# Patient Record
Sex: FEMALE | Race: WHITE
[De-identification: names, ages, dates, MRNs, and addresses within clinical notes are randomized per-mention and may not be internally consistent; named-entity substitution may affect disease eponyms.]

---

## 2017-02-06 ENCOUNTER — RESULT REVIEW (OUTPATIENT)
Age: 67
End: 2017-02-06

## 2017-02-07 ENCOUNTER — APPOINTMENT (OUTPATIENT)
Dept: OBGYN | Facility: CLINIC | Age: 67
End: 2017-02-07

## 2018-11-28 ENCOUNTER — APPOINTMENT (OUTPATIENT)
Dept: PAIN MANAGEMENT | Facility: CLINIC | Age: 68
End: 2018-11-28
Payer: COMMERCIAL

## 2018-11-28 VITALS
WEIGHT: 143 LBS | HEIGHT: 64.5 IN | DIASTOLIC BLOOD PRESSURE: 90 MMHG | BODY MASS INDEX: 24.12 KG/M2 | HEART RATE: 102 BPM | SYSTOLIC BLOOD PRESSURE: 136 MMHG

## 2018-11-28 PROCEDURE — 99213 OFFICE O/P EST LOW 20 MIN: CPT

## 2018-11-28 RX ORDER — ESCITALOPRAM OXALATE 10 MG/1
10 TABLET, FILM COATED ORAL DAILY
Refills: 0 | Status: ACTIVE | COMMUNITY

## 2019-01-14 ENCOUNTER — MEDICATION RENEWAL (OUTPATIENT)
Age: 69
End: 2019-01-14

## 2019-03-28 ENCOUNTER — APPOINTMENT (OUTPATIENT)
Dept: PAIN MANAGEMENT | Facility: CLINIC | Age: 69
End: 2019-03-28
Payer: COMMERCIAL

## 2019-03-28 VITALS
HEIGHT: 64.5 IN | HEART RATE: 98 BPM | BODY MASS INDEX: 24.45 KG/M2 | SYSTOLIC BLOOD PRESSURE: 157 MMHG | DIASTOLIC BLOOD PRESSURE: 87 MMHG | WEIGHT: 145 LBS

## 2019-03-28 PROCEDURE — 99213 OFFICE O/P EST LOW 20 MIN: CPT

## 2019-03-28 RX ORDER — MECLIZINE HYDROCHLORIDE 25 MG/1
25 TABLET ORAL
Qty: 30 | Refills: 0 | Status: ACTIVE | COMMUNITY
Start: 2019-03-08

## 2019-03-28 RX ORDER — GALCANEZUMAB 120 MG/ML
120 INJECTION, SOLUTION SUBCUTANEOUS
Qty: 3 | Refills: 2 | Status: DISCONTINUED | COMMUNITY
Start: 2018-11-28 | End: 2019-03-28

## 2019-03-28 RX ORDER — SCOPALAMINE 1 MG/3D
1 PATCH, EXTENDED RELEASE TRANSDERMAL
Qty: 1 | Refills: 0 | Status: ACTIVE | COMMUNITY
Start: 2019-03-08

## 2019-03-28 RX ORDER — GABAPENTIN 100 MG/1
100 CAPSULE ORAL
Qty: 270 | Refills: 1 | Status: ACTIVE | COMMUNITY
Start: 2019-02-14 | End: 1900-01-01

## 2019-03-29 ENCOUNTER — MEDICATION RENEWAL (OUTPATIENT)
Age: 69
End: 2019-03-29

## 2019-03-29 RX ORDER — ACETAMINOPHEN AND CODEINE 300; 30 MG/1; MG/1
300-30 TABLET ORAL
Qty: 30 | Refills: 1 | Status: ACTIVE | COMMUNITY
Start: 2019-03-28 | End: 1900-01-01

## 2019-04-01 NOTE — HISTORY OF PRESENT ILLNESS
[FreeTextEntry1] : The patient returned today for a follow up ov.  Maxalt provides relief but she is limiting to prevent rebound.  She is getting approximately 18-20 headaches per month and lasting approximately a few hours to day/few days.  The patient has tried BOTOX once in the past and states it was ineffective.  Her headaches are located across her entire head.  She also complains of N/V, photophobia, and phonophobia.   \par \par triggers:tired, nervous, not sleeping enough, weather changes, and alcohol.\par Past treatments:  botox, nortriptylline, advil, aleve, aspirin, axert, codeine, topamax, fioricet, frova, maxalt, magnesium, ultram, lamictal, inderal, excedrin, motrin, and tylenol #3.  She has not had IV infusion or acupuncture. \par \par Current treatments: Maxalt, excedrin, and tylenol #3.  She takes the maxalt approximately 5-6 times per month and tylenol # 3-5 times per month and excedrine 8 times per month. She has been limiting her rescue medications to prevent rebound headaches.  She also notes that neurontin 100 mgs tid has helped some with her headaches and requests a refill for now.

## 2019-04-01 NOTE — PHYSICAL EXAM
[General Appearance - Alert] : alert [Affect] : the affect was normal [Person] : oriented to person [Place] : oriented to place [Time] : oriented to time [Short Term Intact] : short term memory intact [Registration Intact] : recent registration memory intact [Cranial Nerves Optic (II)] : visual acuity intact bilaterally,  visual fields full to confrontation, pupils equal round and reactive to light [Cranial Nerves Oculomotor (III)] : extraocular motion intact [Cranial Nerves Trigeminal (V)] : facial sensation intact symmetrically [Cranial Nerves Facial (VII)] : face symmetrical [Cranial Nerves Vestibulocochlear (VIII)] : hearing was intact bilaterally [Cranial Nerves Hypoglossal (XII)] : there was no tongue deviation with protrusion [Motor Tone] : muscle tone was normal in all four extremities [Motor Strength] : muscle strength was normal in all four extremities [Sensation Tactile Decrease] : light touch was intact [Sclera] : the sclera and conjunctiva were normal [Outer Ear] : the ears and nose were normal in appearance [Neck Appearance] : the appearance of the neck was normal [Abnormal Walk] : normal gait [Skin Turgor] : normal skin turgor [] : no rash

## 2019-04-01 NOTE — ASSESSMENT
[FreeTextEntry1] : 68 year old female patient with chronic migraines and analgesic rebound.\par \par Aimovig 140 mgs q month ordered.  Reviewed with the patient potential side effects, appropriate storage and disposal.  She was offered to schedule an appt for patient education for her first aimovig injection; however, she prefers to do it herself at home.  She will call if further questions or assistance is needed.\par \par We discussed again today for her to limit the amount of maxalt, excedrine, and tylenol #3 to prevent rebound. \par \par She will RTO in 3 months time or sooner if clinically indicated. \par \par  \par

## 2019-05-06 ENCOUNTER — MEDICATION RENEWAL (OUTPATIENT)
Age: 69
End: 2019-05-06

## 2019-09-06 ENCOUNTER — APPOINTMENT (OUTPATIENT)
Dept: PAIN MANAGEMENT | Facility: CLINIC | Age: 69
End: 2019-09-06
Payer: COMMERCIAL

## 2019-09-06 VITALS
BODY MASS INDEX: 24.45 KG/M2 | SYSTOLIC BLOOD PRESSURE: 132 MMHG | DIASTOLIC BLOOD PRESSURE: 79 MMHG | HEART RATE: 106 BPM | HEIGHT: 64.5 IN | WEIGHT: 145 LBS

## 2019-09-06 DIAGNOSIS — R51 HEADACHE: ICD-10-CM

## 2019-09-06 DIAGNOSIS — G43.909 MIGRAINE, UNSPECIFIED, NOT INTRACTABLE, W/OUT STATUS MIGRAINOSUS: ICD-10-CM

## 2019-09-06 PROCEDURE — 99213 OFFICE O/P EST LOW 20 MIN: CPT

## 2019-09-09 NOTE — HISTORY OF PRESENT ILLNESS
[FreeTextEntry1] : Ms. Obrien returned today for a follow up office visit.  Maxalt provides relief but she is limiting to prevent rebound.  She is getting approximately 18-20 headaches per month and lasting approximately a few hours to day/few days.  She also complains of N/V, photophobia, and phonophobia.  She has tried aimovig, emgality, and BOTOX without relief.  \par \par Past treatments:  botox, nortriptylline, advil, aleve, aspirin, axert, codeine, topamax, fioricet, frova, maxalt, magnesium, ultram, lamictal, inderal, excedrin, motrin, and tylenol #3.  She has not had IV infusion or acupuncture. \par \par

## 2019-09-09 NOTE — ASSESSMENT
[FreeTextEntry1] : No signs of toxicity. Will continue to monitor.  She will discontinue Aimovig due to no relief.  She will begin propranolol 10 mgs 1 po bid and prednisone 10 mgs (tapering dose), which she has at home. The remainder of her medications will remain unchanged for now.  We may need to consider an anti-emetic if the above does not help to relieve her symptoms.  \par \par We discussed again today for her to limit the amount of maxalt, excedrine, and tylenol #3 to prevent rebound. \par \par She will RTO in 2 months time or sooner if clinically indicated. \par \par Dr. Avendaño on site.  Billed incident to the service. \par  \par

## 2019-09-09 NOTE — PHYSICAL EXAM
[Affect] : the affect was normal [General Appearance - Alert] : alert [Person] : oriented to person [Place] : oriented to place [Short Term Intact] : short term memory intact [Time] : oriented to time [Cranial Nerves Optic (II)] : visual acuity intact bilaterally,  visual fields full to confrontation, pupils equal round and reactive to light [Registration Intact] : recent registration memory intact [Cranial Nerves Trigeminal (V)] : facial sensation intact symmetrically [Cranial Nerves Oculomotor (III)] : extraocular motion intact [Cranial Nerves Facial (VII)] : face symmetrical [Cranial Nerves Hypoglossal (XII)] : there was no tongue deviation with protrusion [Cranial Nerves Vestibulocochlear (VIII)] : hearing was intact bilaterally [Motor Tone] : muscle tone was normal in all four extremities [Sensation Tactile Decrease] : light touch was intact [Motor Strength] : muscle strength was normal in all four extremities [Sclera] : the sclera and conjunctiva were normal [Outer Ear] : the ears and nose were normal in appearance [Neck Appearance] : the appearance of the neck was normal [Abnormal Walk] : normal gait [Skin Turgor] : normal skin turgor [] : no rash

## 2019-11-04 ENCOUNTER — RX RENEWAL (OUTPATIENT)
Age: 69
End: 2019-11-04

## 2019-11-04 RX ORDER — PROPRANOLOL HYDROCHLORIDE 10 MG/1
10 TABLET ORAL
Qty: 60 | Refills: 1 | Status: ACTIVE | COMMUNITY
Start: 2019-09-06 | End: 1900-01-01

## 2019-12-09 ENCOUNTER — OTHER (OUTPATIENT)
Age: 69
End: 2019-12-09

## 2019-12-10 ENCOUNTER — APPOINTMENT (OUTPATIENT)
Dept: PAIN MANAGEMENT | Facility: CLINIC | Age: 69
End: 2019-12-10

## 2020-01-06 ENCOUNTER — RX RENEWAL (OUTPATIENT)
Age: 70
End: 2020-01-06

## 2020-01-06 RX ORDER — ERENUMAB-AOOE 140 MG/ML
140 INJECTION, SOLUTION SUBCUTANEOUS
Qty: 1 | Refills: 5 | Status: ACTIVE | COMMUNITY
Start: 2019-03-28 | End: 1900-01-01

## 2024-04-18 ENCOUNTER — APPOINTMENT (OUTPATIENT)
Dept: RADIOLOGY | Facility: CLINIC | Age: 74
End: 2024-04-18
Payer: COMMERCIAL

## 2024-04-18 ENCOUNTER — APPOINTMENT (OUTPATIENT)
Dept: MAMMOGRAPHY | Facility: CLINIC | Age: 74
End: 2024-04-18
Payer: COMMERCIAL

## 2024-04-18 PROCEDURE — 77063 BREAST TOMOSYNTHESIS BI: CPT

## 2024-04-18 PROCEDURE — 77067 SCR MAMMO BI INCL CAD: CPT

## 2024-04-18 PROCEDURE — 77080 DXA BONE DENSITY AXIAL: CPT
